# Patient Record
Sex: MALE | Race: WHITE | NOT HISPANIC OR LATINO | Employment: FULL TIME | URBAN - METROPOLITAN AREA
[De-identification: names, ages, dates, MRNs, and addresses within clinical notes are randomized per-mention and may not be internally consistent; named-entity substitution may affect disease eponyms.]

---

## 2017-02-03 ENCOUNTER — ALLSCRIPTS OFFICE VISIT (OUTPATIENT)
Dept: OTHER | Facility: OTHER | Age: 52
End: 2017-02-03

## 2017-02-21 RX ORDER — BETAMETHASONE DIPROPIONATE 0.5 MG/G
CREAM TOPICAL 2 TIMES DAILY
COMMUNITY
End: 2022-05-05

## 2017-02-23 ENCOUNTER — ANESTHESIA EVENT (OUTPATIENT)
Dept: GASTROENTEROLOGY | Facility: MEDICAL CENTER | Age: 52
End: 2017-02-23
Payer: COMMERCIAL

## 2017-02-23 ENCOUNTER — GENERIC CONVERSION - ENCOUNTER (OUTPATIENT)
Dept: GASTROENTEROLOGY | Facility: MEDICAL CENTER | Age: 52
End: 2017-02-23

## 2017-02-23 ENCOUNTER — HOSPITAL ENCOUNTER (OUTPATIENT)
Facility: MEDICAL CENTER | Age: 52
Setting detail: OUTPATIENT SURGERY
Discharge: HOME | End: 2017-02-23
Attending: INTERNAL MEDICINE | Admitting: INTERNAL MEDICINE
Payer: COMMERCIAL

## 2017-02-23 ENCOUNTER — ANESTHESIA (OUTPATIENT)
Dept: GASTROENTEROLOGY | Facility: MEDICAL CENTER | Age: 52
End: 2017-02-23
Payer: COMMERCIAL

## 2017-02-23 VITALS
HEART RATE: 68 BPM | HEIGHT: 70 IN | BODY MASS INDEX: 23.19 KG/M2 | DIASTOLIC BLOOD PRESSURE: 94 MMHG | SYSTOLIC BLOOD PRESSURE: 126 MMHG | RESPIRATION RATE: 16 BRPM | TEMPERATURE: 96.7 F | WEIGHT: 162 LBS | OXYGEN SATURATION: 100 %

## 2017-02-23 DIAGNOSIS — Z12.11 ENCOUNTER FOR SCREENING FOR MALIGNANT NEOPLASM OF COLON: ICD-10-CM

## 2017-02-23 PROCEDURE — 88305 TISSUE EXAM BY PATHOLOGIST: CPT | Performed by: INTERNAL MEDICINE

## 2017-02-23 RX ORDER — PROPOFOL 10 MG/ML
INJECTION, EMULSION INTRAVENOUS AS NEEDED
Status: DISCONTINUED | OUTPATIENT
Start: 2017-02-23 | End: 2017-02-23 | Stop reason: SURG

## 2017-02-23 RX ORDER — SODIUM CHLORIDE 9 MG/ML
125 INJECTION, SOLUTION INTRAVENOUS CONTINUOUS
Status: DISCONTINUED | OUTPATIENT
Start: 2017-02-23 | End: 2017-02-23 | Stop reason: HOSPADM

## 2017-02-23 RX ADMIN — PROPOFOL 50 MG: 10 INJECTION, EMULSION INTRAVENOUS at 12:15

## 2017-02-23 RX ADMIN — PROPOFOL 30 MG: 10 INJECTION, EMULSION INTRAVENOUS at 12:11

## 2017-02-23 RX ADMIN — PROPOFOL 50 MG: 10 INJECTION, EMULSION INTRAVENOUS at 12:17

## 2017-02-23 RX ADMIN — PROPOFOL 30 MG: 10 INJECTION, EMULSION INTRAVENOUS at 12:10

## 2017-02-23 RX ADMIN — LIDOCAINE HYDROCHLORIDE 50 MG: 20 INJECTION, SOLUTION INTRAVENOUS at 12:04

## 2017-02-23 RX ADMIN — SODIUM CHLORIDE: 0.9 INJECTION, SOLUTION INTRAVENOUS at 11:57

## 2017-02-23 RX ADMIN — PROPOFOL 30 MG: 10 INJECTION, EMULSION INTRAVENOUS at 12:22

## 2017-02-23 RX ADMIN — SODIUM CHLORIDE 125 ML/HR: 0.9 INJECTION, SOLUTION INTRAVENOUS at 11:30

## 2017-02-23 RX ADMIN — PROPOFOL 40 MG: 10 INJECTION, EMULSION INTRAVENOUS at 12:31

## 2017-02-23 RX ADMIN — PROPOFOL 30 MG: 10 INJECTION, EMULSION INTRAVENOUS at 12:28

## 2017-02-23 RX ADMIN — PROPOFOL 40 MG: 10 INJECTION, EMULSION INTRAVENOUS at 12:12

## 2017-02-23 RX ADMIN — PROPOFOL 100 MG: 10 INJECTION, EMULSION INTRAVENOUS at 12:09

## 2017-03-12 ENCOUNTER — GENERIC CONVERSION - ENCOUNTER (OUTPATIENT)
Dept: OTHER | Facility: OTHER | Age: 52
End: 2017-03-12

## 2017-03-30 ENCOUNTER — GENERIC CONVERSION - ENCOUNTER (OUTPATIENT)
Dept: OTHER | Facility: OTHER | Age: 52
End: 2017-03-30

## 2018-01-10 NOTE — RESULT NOTES
Message   Pathology showed hyperplastic polyp  This is benign polyp  Colonoscopy recommended in ten years  Verified Results  (1) TISSUE EXAM [id number] 12:20PM Ok Mueller     Test Name Result Flag Reference   LAB AP CASE REPORT (Report)     Surgical Pathology Report             Case: [id number]                   Authorizing Provider: Declan Phan MD      Collected:      02/23/2017 1220        Ordering Location:   [facility]    Received:      02/24/2017 [address] End[facility]                            Pathologist:      Nelia Baxter MD                                 Specimens:  A) - Polyp, Colorectal, Transverse colon polyp                             B) - Polyp, Colorectal, Rectal polyp times 2   LAB AP FINAL DIAGNOSIS (Report)     A  Transverse colon polyp (biopsy):    - Polypoid colonic mucosa with no significant pathologic abnormality  Lymphoid aggregates noted  - No high-grade dysplasia or malignancy identified  B  Rectal polyp ??2 (biopsies):    - Portions of polypoid colonic mucosa with features of hyperplastic   polyps  - No high-grade dysplasia or malignancy identified  Electronically signed by Nelia Baxter MD on 2/27/2017 at 11:42 AM   LAB AP NOTE      Interpretation performed at [facility], [address] [place] [address], [address] [place] [address] Drive [address]  LAB AP SURGICAL ADDITIONAL INFORMATION (Report)     These tests were developed and their performance characteristics   determined by [facility] or [address] [place] [address]  They may not be cleared or approved by the [place]  Food and   Drug Administration  The FDA has determined that such clearance or   approval is not necessary  These tests are used for clinical purposes  They should not be regarded as investigational or for research  This   laboratory has been approved by [place] [id number], designated as a high-complexity   laboratory and is qualified to perform these tests     LAB AP GROSS DESCRIPTION (Report)     A  The specimen is received in formalin, labeled with the patient's name   and hospital number, and is designated transverse colon polyp  The   specimen consists of a single white to tan soft tissue fragment measuring   0 4 x 0 3 x 0 2 cm  Entirely submitted  One cassette  Note: The estimated total formalin fixation time based upon information   provided by the submitting clinician and the standard processing schedule   is 25 75 hours  B  The specimen is received in formalin, labeled with the patient's name   and hospital number, and is designated rectal polyp times 2  The   specimen consists of 2 white to tan to brown soft tissue fragments   measuring 0 3 and 0 7 centimeters in greatest dimension  Entirely   submitted  One cassette  Note: The estimated total formalin fixation time based upon information   provided by the submitting clinician and the standard processing schedule   is 25 5 hours        ACL   LAB AP CLINICAL INFORMATION      Encounter for screening for malignant neoplasm of colon

## 2018-01-14 VITALS
BODY MASS INDEX: 24.34 KG/M2 | OXYGEN SATURATION: 98 % | HEART RATE: 80 BPM | SYSTOLIC BLOOD PRESSURE: 118 MMHG | TEMPERATURE: 96.5 F | HEIGHT: 70 IN | WEIGHT: 170 LBS | RESPIRATION RATE: 18 BRPM | DIASTOLIC BLOOD PRESSURE: 70 MMHG

## 2018-08-20 ENCOUNTER — EVALUATION (OUTPATIENT)
Dept: PHYSICAL THERAPY | Facility: REHABILITATION | Age: 53
End: 2018-08-20
Payer: COMMERCIAL

## 2018-08-20 DIAGNOSIS — Z98.890 S/P ARTHROSCOPIC SURGERY OF LEFT KNEE: ICD-10-CM

## 2018-08-20 DIAGNOSIS — M94.262 CHONDROMALACIA, LEFT KNEE: ICD-10-CM

## 2018-08-20 DIAGNOSIS — M25.562 LEFT KNEE PAIN, UNSPECIFIED CHRONICITY: Primary | ICD-10-CM

## 2018-08-20 PROCEDURE — G8979 MOBILITY GOAL STATUS: HCPCS | Performed by: PHYSICAL THERAPIST

## 2018-08-20 PROCEDURE — G8978 MOBILITY CURRENT STATUS: HCPCS | Performed by: PHYSICAL THERAPIST

## 2018-08-20 PROCEDURE — 97161 PT EVAL LOW COMPLEX 20 MIN: CPT | Performed by: PHYSICAL THERAPIST

## 2018-08-20 PROCEDURE — 97110 THERAPEUTIC EXERCISES: CPT | Performed by: PHYSICAL THERAPIST

## 2018-08-20 NOTE — PROGRESS NOTES
PT Evaluation     Today's date: 2018  Patient name: Jessee Laura  : 1965  MRN: [id number]  Referring provider: Vlad Rosa MD  Dx:   Encounter Diagnosis     ICD-10-CM    1  Left knee pain, unspecified chronicity M25 562    2  Chondromalacia, left knee M94 262    3  S/P arthroscopic surgery of left knee Z98 890        Start Time: 52  Stop Time: 0558  Total time in clinic (min): 50 minutes    Assessment  Impairments: abnormal gait, abnormal or restricted ROM, impaired physical strength and lacks appropriate home exercise program    Assessment details: Patient is a 45year old male that presents status post left arthroscopic knee surgery  Patient presents with decreased strength, decreased ROM, and gait abnormalitities  Patient has difficulty with recreational activities, ambulation, negotiation of stairs, and housework secondary to impairments  Patient would benefit from skilled physical therapy services to address impairments to maximize function  Barriers to therapy: none  Understanding of Dx/Px/POC: good   Prognosis: good    Goals  Impairment:  1  Patient will reports 50% reduction in pain in 4 weeks to maximize function  2   Patient will improve strength to 4+/5 in all planes to maximize function  3   Patient will improve ROM to [facility] in 4 weeks to maximize function  Functional:  1  Patient will improve FOTO by 33 points to 67/100 in 4 weeks to maximize function  2  Patient will be independent with HEP in 4 weeks to maximize function  3  Patient will report no difficulty with ambulation in 4 weeks to maximize function  4  Patient will report no difficulty with negotiation of stairs in 4 weeks to maximize function  5  Patient will report no difficulty with house/yard work in 4 weeks to maximize function        Plan  Patient would benefit from: skilled physical therapy  Planned modality interventions: cryotherapy  Planned therapy interventions: home exercise program, functional ROM exercises, therapeutic exercise, strengthening, stretching, patient education and neuromuscular re-education  Frequency: 2x week  Duration in visits: 9  Duration in weeks: 6  Plan of Care beginning date: 2018  Treatment plan discussed with: patient  Plan details: Will start at two visits week transitioning to one visit/week when appropriate  Will RE in 6 weeks  Subjective Evaluation    History of Present Illness  Date of surgery: 2018  Mechanism of injury: Patient presents status post left arthroscopic knee surgery on 2018  Patient reports a history of left knee pain for about 12 years, but started to have increased pain in May 2018  Patient reports difficulty with soccer, skiing, volleyball, ambulation, negotiation of stairs, ADLS such as LE dressing, and house/yard work  Pain  Current pain ratin  At best pain ratin  At worst pain ratin  Location: posterior/medial knee  Quality: dull ache  Relieving factors: ice  Aggravating factors: walking and stair climbing    Social Support  Stairs in house: yes (basement)     Patient Goals  Patient goals for therapy: return to sport/leisure activities, decreased pain, increased motion and increased strength  Patient goal: ambulation        Objective     Observations   Left Knee   Positive for effusion and incision  Additional Observation Details  Steri-strips in place, appears healthy, reviewed signs of infection    Passive Range of Motion   Left Knee   Flexion: 110 degrees with pain  Prone flexion: 90 degrees   Extension: -20 degrees with pain    Right Knee   Normal passive range of motion    Mobility   Patellar Mobility:   Left Knee   WFL: medial, lateral, superior and inferior       Strength/Myotome Testing     Left Hip   Planes of Motion   Flexion: 5  Abduction: 4    Right Hip   Planes of Motion   Flexion: 5  Abduction: 4+    Left Knee   Flexion: 5  Extension: 4-    Right Knee   Normal strength    Left Ankle/Foot   Dorsiflexion: 5    Right Ankle/Foot   Dorsiflexion: 5    Ambulation     Ambulation: Level Surfaces   Ambulation without assistive device: independent    Observational Gait   Decreased walking speed and stride length     Left foot contact pattern: foot flat      Flowsheet Rows      Most Recent Value   PT/OT G-Codes   Current Score  34   Projected Score  67   FOTO information reviewed  Yes   Assessment Type  Evaluation   G code set  Mobility: Walking & Moving Around   Mobility: Walking and Moving Around Current Status ()  CL   Mobility: Walking and Moving Around Goal Status ()  CJ          Precautions: WBAT    Daily Treatment Diary     Manual              knee PROM                                                                     Exercise Diary  8/20            Recumbent bike             LLLD knee ext stretch provided HEP            Quad set provided HEP            sidelying hip abduction provided HEP            Prone hip ext             AAROM heel slide 20x            sidestepping             Pilates reformer squat             SAQ                                                                                                                                                                Modalities              ice

## 2018-08-20 NOTE — LETTER
2018    Renae You MD  [facility]    Patient: Armaan Whitten   YOB: 1965   Date of Visit: 2018     Encounter Diagnosis     ICD-10-CM    1  Left knee pain, unspecified chronicity M25 562    2  Chondromalacia, left knee M94 262    3  S/P arthroscopic surgery of left knee Z98 890        Dear Dr Day Del Cid:    Please review the attached Plan of Care from [facility] recent visit  Please verify that you agree therapy should continue by signing the attached document and sending it back to our office  If you have any questions or concerns, please don't hesitate to call  Sincerely,    Nicolás Underwood, PT      Referring Provider:      I certify that I have read the below Plan of Care and certify the need for these services furnished under this plan of treatment while under my care  Renae You MD  [address]: [phone]          PT Evaluation     Today's date: 2018  Patient name: Armaan Whitten  : 1965  MRN: [id number]  Referring provider: Kal Bucio MD  Dx:   Encounter Diagnosis     ICD-10-CM    1  Left knee pain, unspecified chronicity M25 562    2  Chondromalacia, left knee M94 262    3  S/P arthroscopic surgery of left knee Z98 890        Start Time: 75  Stop Time: 7406  Total time in clinic (min): 50 minutes    Assessment  Impairments: abnormal gait, abnormal or restricted ROM, impaired physical strength and lacks appropriate home exercise program    Assessment details: Patient is a 45year old male that presents status post left arthroscopic knee surgery  Patient presents with decreased strength, decreased ROM, and gait abnormalitities  Patient has difficulty with recreational activities, ambulation, negotiation of stairs, and housework secondary to impairments    Patient would benefit from skilled physical therapy services to address impairments to maximize function  Barriers to therapy: none  Understanding of Dx/Px/POC: good   Prognosis: good    Goals  Impairment:  1  Patient will reports 50% reduction in pain in 4 weeks to maximize function  2   Patient will improve strength to 4+/5 in all planes to maximize function  3   Patient will improve ROM to [facility] in 4 weeks to maximize function  Functional:  1  Patient will improve FOTO by 33 points to 67/100 in 4 weeks to maximize function  2  Patient will be independent with HEP in 4 weeks to maximize function  3  Patient will report no difficulty with ambulation in 4 weeks to maximize function  4  Patient will report no difficulty with negotiation of stairs in 4 weeks to maximize function  5  Patient will report no difficulty with house/yard work in 4 weeks to maximize function  Plan  Patient would benefit from: skilled physical therapy  Planned modality interventions: cryotherapy  Planned therapy interventions: home exercise program, functional ROM exercises, therapeutic exercise, strengthening, stretching, patient education and neuromuscular re-education  Frequency: 2x week  Duration in visits: 9  Duration in weeks: 6  Plan of Care beginning date: 2018  Treatment plan discussed with: patient  Plan details: Will start at two visits week transitioning to one visit/week when appropriate  Will RE in 6 weeks  Subjective Evaluation    History of Present Illness  Date of surgery: 2018  Mechanism of injury: Patient presents status post left arthroscopic knee surgery on 2018  Patient reports a history of left knee pain for about 12 years, but started to have increased pain in May 2018  Patient reports difficulty with soccer, skiing, volleyball, ambulation, negotiation of stairs, ADLS such as LE dressing, and house/yard work       Pain  Current pain ratin  At best pain ratin  At worst pain ratin  Location: posterior/medial knee  Quality: dull ache  Relieving factors: ice  Aggravating factors: walking and stair climbing    Social Support  Stairs in house: yes (basement)     Patient Goals  Patient goals for therapy: return to sport/leisure activities, decreased pain, increased motion and increased strength  Patient goal: ambulation        Objective     Observations   Left Knee   Positive for effusion and incision  Additional Observation Details  Steri-strips in place, appears healthy, reviewed signs of infection    Passive Range of Motion   Left Knee   Flexion: 110 degrees with pain  Prone flexion: 90 degrees   Extension: -20 degrees with pain    Right Knee   Normal passive range of motion    Mobility   Patellar Mobility:   Left Knee   WFL: medial, lateral, superior and inferior  Strength/Myotome Testing     Left Hip   Planes of Motion   Flexion: 5  Abduction: 4    Right Hip   Planes of Motion   Flexion: 5  Abduction: 4+    Left Knee   Flexion: 5  Extension: 4-    Right Knee   Normal strength    Left Ankle/Foot   Dorsiflexion: 5    Right Ankle/Foot   Dorsiflexion: 5    Ambulation     Ambulation: Level Surfaces   Ambulation without assistive device: independent    Observational Gait   Decreased walking speed and stride length     Left foot contact pattern: foot flat      Flowsheet Rows      Most Recent Value   PT/OT G-Codes   Current Score  34   Projected Score  67   FOTO information reviewed  Yes   Assessment Type  Evaluation   G code set  Mobility: Walking & Moving Around   Mobility: Walking and Moving Around Current Status ()  CL   Mobility: Walking and Moving Around Goal Status ()  CJ          Precautions: WBAT    Daily Treatment Diary     Manual              knee PROM                                                                     Exercise Diary  8/20            Recumbent bike             LLLD knee ext stretch provided HEP            Quad set provided HEP            sidelying hip abduction provided HEP            Prone hip ext AAROM heel slide 20x            sidestepping             Pilates reformer squat             SAQ                                                                                                                                                                Modalities              ice

## 2018-08-21 ENCOUNTER — TRANSCRIBE ORDERS (OUTPATIENT)
Dept: PHYSICAL THERAPY | Facility: REHABILITATION | Age: 53
End: 2018-08-21

## 2018-08-21 DIAGNOSIS — M94.262 CHONDROMALACIA, LEFT KNEE: ICD-10-CM

## 2018-08-21 DIAGNOSIS — Z98.890 S/P ARTHROSCOPIC SURGERY OF LEFT KNEE: ICD-10-CM

## 2018-08-21 DIAGNOSIS — M25.562 LEFT KNEE PAIN, UNSPECIFIED CHRONICITY: Primary | ICD-10-CM

## 2018-08-23 ENCOUNTER — OFFICE VISIT (OUTPATIENT)
Dept: PHYSICAL THERAPY | Facility: REHABILITATION | Age: 53
End: 2018-08-23
Payer: COMMERCIAL

## 2018-08-23 DIAGNOSIS — M94.262 CHONDROMALACIA, LEFT KNEE: ICD-10-CM

## 2018-08-23 DIAGNOSIS — M25.562 LEFT KNEE PAIN, UNSPECIFIED CHRONICITY: Primary | ICD-10-CM

## 2018-08-23 DIAGNOSIS — Z98.890 S/P ARTHROSCOPIC SURGERY OF LEFT KNEE: ICD-10-CM

## 2018-08-23 PROCEDURE — 97110 THERAPEUTIC EXERCISES: CPT | Performed by: PHYSICAL THERAPIST

## 2018-08-23 NOTE — PROGRESS NOTES
Daily Note     Today's date: 2018  Patient name: Riley Keen  : 1965  MRN: [id number]  Referring provider: Shukri Interiano MD  Dx:   Encounter Diagnosis     ICD-10-CM    1  Left knee pain, unspecified chronicity M25 562    2  Chondromalacia, left knee M94 262    3  S/P arthroscopic surgery of left knee Z98 890                   Subjective: Patient reported that he parked his car further away and felt "fatigue" while making it to the steps today at work  Patient mentioned that he has decreased pain and has been feeling stronger while at work  Patient noted that he has discontinued using pillows under his knee  Patient also mentioned that there was some swelling after walking long distances  Patient mentioned that he is excited to get back to working out         Objective: See treatment diary below  Precautions: WBAT     Daily Treatment Diary      Manual                        knee PROM    np                                                                                                                         Exercise Diary                     Recumbent bike  np  7 min                   LLLD knee ext stretch provided HEP  np                   Quad set provided HEP  x20                   sidelying hip abduction provided HEP  x10                   Prone hip ext    x15                   AAROM heel slide 20x  5''x20  dc                 sidestepping    3x ea  band                 Pilates reformer squat   x15, x10 single leg RYR L2  single leg only RYRG                 SAQ    5'' x20  weight                 SLR     x5                    bridge    NV                    SLS    NV                                                                                                                                                                                                                         Modalities                        ice  declined                                                                           Assessment: Patient tolerated treatment well  Patient has progressed in hip and knee strengthening  Patient was cued to prevent quad lag, but able to perform SLR  Patient was able to to complete revolutions on bike without difficulty and has increased knee flexion since last visit to 145 degrees  Patient would benefit from continued PT to work on strengthening and ROM to improve function  Plan: Progress treatment as tolerated      Treatment performed by Jonny Buchanan, SPT supervised by Aldo Irvin, JOYT   One on one with Aldo Anne, KIM from 6:00 pm-6:20 pm

## 2018-08-27 ENCOUNTER — OFFICE VISIT (OUTPATIENT)
Dept: PHYSICAL THERAPY | Facility: REHABILITATION | Age: 53
End: 2018-08-27
Payer: COMMERCIAL

## 2018-08-27 DIAGNOSIS — M94.262 CHONDROMALACIA, LEFT KNEE: ICD-10-CM

## 2018-08-27 DIAGNOSIS — M25.562 LEFT KNEE PAIN, UNSPECIFIED CHRONICITY: Primary | ICD-10-CM

## 2018-08-27 DIAGNOSIS — Z98.890 S/P ARTHROSCOPIC SURGERY OF LEFT KNEE: ICD-10-CM

## 2018-08-27 PROCEDURE — 97110 THERAPEUTIC EXERCISES: CPT | Performed by: PHYSICAL THERAPIST

## 2018-08-27 NOTE — PROGRESS NOTES
Daily Note     Today's date: 2018  Patient name: Myrtle Webb  : 1965  MRN: [id number]  Referring provider: Jace Snider MD  Dx:   Encounter Diagnosis     ICD-10-CM    1  Left knee pain, unspecified chronicity M25 562    2  Chondromalacia, left knee M94 262    3  S/P arthroscopic surgery of left knee Z98 890                   Subjective: Patient reported that he "overdid" it on  and had bumped up his "active time" to 49 minutes  He noted that he felt some "pulling" at the back of his knee and ice helped it  Rodolfo Almanzar said, "I almost ran down steps today", forgetting that I injured my leg  Pt noted HEP compliance         Objective: See treatment diary below  Precautions: WBAT     Daily Treatment Diary      Manual                      knee PROM    np  dc                                                                                                                       Exercise Diary                   Recumbent bike  np  7 min  7 min                 LLLD knee ext stretch provided HEP  np  np                 Quad set provided HEP  x20  x20                 sidelying hip abduction provided HEP  x10  x15                 Prone hip ext    x15  x10                 AAROM heel slide 20x  5''x20  dc                 sidestepping    3x ea  x2 ea,Red                 Pilates reformer squat    x15, x10 single leg RYR L2 Single leg x6,   double leg x10 RYRG                 SAQ    5'' x20 2lb x15                 SLR     x5  x10                  bridge    NV  5''x10                  SLS    NV  30 secx3                 Mini-squat      nv                 Lunge on Bosu       ?                  quad stretch      ?                                                                                                                                               Modalities                    ice  declined  declined                                                                    Assessment: Patient tolerated treatment well  Patient needed cuing for quad set , but was able to perform  Patient was cued during SLR, but had visually improved since last visit  Patient was measured at 0 degrees of L knee extension  Patient would benefit from continued PT to improve on strengthening, ROM, and proprioception to maximize function  Plan: Progress treatment as tolerated        Patient was treated by MALCOLM Tamez and supervised by King Tolentino DPT

## 2018-08-30 ENCOUNTER — OFFICE VISIT (OUTPATIENT)
Dept: PHYSICAL THERAPY | Facility: REHABILITATION | Age: 53
End: 2018-08-30
Payer: COMMERCIAL

## 2018-08-30 DIAGNOSIS — M94.262 CHONDROMALACIA, LEFT KNEE: ICD-10-CM

## 2018-08-30 DIAGNOSIS — M25.562 LEFT KNEE PAIN, UNSPECIFIED CHRONICITY: Primary | ICD-10-CM

## 2018-08-30 DIAGNOSIS — Z98.890 S/P ARTHROSCOPIC SURGERY OF LEFT KNEE: ICD-10-CM

## 2018-08-30 PROCEDURE — 97110 THERAPEUTIC EXERCISES: CPT

## 2018-08-30 PROCEDURE — 97112 NEUROMUSCULAR REEDUCATION: CPT

## 2018-08-30 NOTE — PROGRESS NOTES
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    3  S/P arthroscopic surgery of left knee Z98 890                   Subjective: Pt reports being on his feet for good portion of the morning then sat for "too long" causing increase in tightness arriving to PT today  Objective: See treatment diary below  Precautions: WBAT     Daily Treatment Diary      Manual                    knee PROM    np  dc                                                                                                                       Exercise Diary                 Recumbent bike  np  7 min  7 min  7 min               LLLD knee ext stretch provided HEP  np  np  np               Quad set provided HEP  x20  x20  5"x20               sidelying hip abduction provided HEP  x10  x15  x15               Prone hip ext    x15  x10 3"x15               AAROM heel slide 20x  5''x20  dc                 sidestepping    3x ea  x2 ea,Red  rtb x3 ea               Pilates reformer squat    x15, x10 single leg RYR L2 Single leg x6,   double leg x10 RYRG RYRG single x10 double x15               SAQ    5'' x20 2lb x15  2# x15               SLR     x5  x10  x10                bridge    NV  5''x10  5"x15                SLS    NV  30 secx3  30"x3               Mini-squat      nv  x10               Lunge on Bosu       ?  np                quad stretch      ?                                                                                                                                               Modalities                ice  declined  declined    np                                                                Assessment: Patient tolerated treatment well   Pt has hip compensation present with quad sets in supine, in seated position patient is able to achieve good quad set and control  Pt fatigues with TE, needs VC for TKE with SLR as patient has mild quad lag with fatigue that he is able to correct with cues  Initiated mini squats, needs cues to remain within painfree ROM with equal weight bearing bilaterally  Patient would benefit from continued PT  Plan: Progress treatment as tolerated

## 2018-09-06 ENCOUNTER — OFFICE VISIT (OUTPATIENT)
Dept: PHYSICAL THERAPY | Facility: REHABILITATION | Age: 53
End: 2018-09-06
Payer: COMMERCIAL

## 2018-09-06 DIAGNOSIS — Z98.890 S/P ARTHROSCOPIC SURGERY OF LEFT KNEE: ICD-10-CM

## 2018-09-06 DIAGNOSIS — M94.262 CHONDROMALACIA, LEFT KNEE: ICD-10-CM

## 2018-09-06 DIAGNOSIS — M25.562 LEFT KNEE PAIN, UNSPECIFIED CHRONICITY: Primary | ICD-10-CM

## 2018-09-06 PROCEDURE — 97112 NEUROMUSCULAR REEDUCATION: CPT

## 2018-09-06 PROCEDURE — 97110 THERAPEUTIC EXERCISES: CPT

## 2018-09-06 NOTE — PROGRESS NOTES
Daily Note     Today's date: 2018  Patient name: Danish Toure  : 1965  MRN: [id number]  Referring provider: Ahsan Copeland MD  Dx:   Encounter Diagnosis     ICD-10-CM    1  Left knee pain, unspecified chronicity M25 562    2  Chondromalacia, left knee M94 262    3  S/P arthroscopic surgery of left knee Z98 890                   Subjective: Pt reports he may have over did it over the weekend  Pt notes performing on average 4 miles of walking daily according to his exercise tracker, but because he felt good he performed more walking activities causing increase in soreness along medial joint line  Pt notes icing and resting for sx control with good relief, notes sx's are minimal arriving to PT today        Objective: See treatment diary below  Precautions: WBAT     Daily Treatment Diary      Manual                  knee PROM    np  dc                                                                                                                       Exercise Diary               Recumbent bike  np  7 min  7 min  7 min 5 min             LLLD knee ext stretch provided HEP  np  np  np  np             Quad set provided HEP  x20  x20  5"x20  5"x20             sidelying hip abduction provided HEP  x10  x15  x15  x18             Prone hip ext    x15  x10 3"x15  3"x15             AAROM heel slide 20x  5''x20  dc                 sidestepping    3x ea  x2 ea,Red  rtb x3 ea  rtb x4 ea             Pilates reformer squat    x15, x10 single leg RYR L2 Single leg x6,   double leg x10 RYRG RYRG single x10 double x15 RYRG single x15 double x20             SAQ    5'' x20 2lb x15  2# x15  2# x20             SLR     x5  x10  x10  3"x10              bridge    NV  5''x10  5"x15  5"x15              SLS    NV  30 secx3  30"x3  30"x3             Mini-squat      nv  x10  x15             Lunge on Bosu       ?  np  x10              quad stretch      ?                                                                                                                                               Modalities   8/23 8/27 8/30 9/6           ice  declined  declined    np  np                                                              Assessment: Patient tolerated treatment well  Pt demo's improved strength with SLR, needs cues to initiate quad contraction but is better able to maintain throughout and tolerated progressions as noted  Initiated lunges with anterior foot on bosu, needs cueing to maintain equal weight bearing bilaterally and performs without pain  Patient would benefit from continued PT  Plan: Progress treatment as tolerated

## 2018-09-10 ENCOUNTER — OFFICE VISIT (OUTPATIENT)
Dept: PHYSICAL THERAPY | Facility: REHABILITATION | Age: 53
End: 2018-09-10
Payer: COMMERCIAL

## 2018-09-10 DIAGNOSIS — M94.262 CHONDROMALACIA, LEFT KNEE: ICD-10-CM

## 2018-09-10 DIAGNOSIS — M25.562 LEFT KNEE PAIN, UNSPECIFIED CHRONICITY: Primary | ICD-10-CM

## 2018-09-10 DIAGNOSIS — Z98.890 S/P ARTHROSCOPIC SURGERY OF LEFT KNEE: ICD-10-CM

## 2018-09-10 PROCEDURE — G8978 MOBILITY CURRENT STATUS: HCPCS | Performed by: PHYSICAL THERAPIST

## 2018-09-10 PROCEDURE — 97110 THERAPEUTIC EXERCISES: CPT | Performed by: PHYSICAL THERAPIST

## 2018-09-10 PROCEDURE — G8979 MOBILITY GOAL STATUS: HCPCS | Performed by: PHYSICAL THERAPIST

## 2018-09-10 PROCEDURE — 97112 NEUROMUSCULAR REEDUCATION: CPT | Performed by: PHYSICAL THERAPIST

## 2018-09-10 NOTE — PROGRESS NOTES
Daily Note     Today's date: 9/10/2018  Patient name: Ulises Epps  : 1965  MRN: [id number]  Referring provider: Richa Bernal MD  Dx:   Encounter Diagnosis     ICD-10-CM    1  Left knee pain, unspecified chronicity M25 562    2  Chondromalacia, left knee M94 262    3  S/P arthroscopic surgery of left knee [id number] [id number]                   Subjective: Patient reports that he is doing well this visit  He had some soreness after standing at a concert on Saturday for about 4 hours, but did not have significant soreness on   Patient reports riding a stationary bike this morning with some mild anterior knee pain          Objective: See treatment diary below  Precautions: WBAT     Daily Treatment Diary      Manual                  knee PROM    np  dc                                                                                                                       Exercise Diary  8/20  8/23  8/27  8/30  9/6  9/10         Recumbent bike  np  7 min  7 min  7 min 5 min  9 min         LLLD knee ext stretch provided HEP  np  np  np  np np         Quad set provided HEP  x20  x20  5"x20  5"x20 D/c         sidelying hip abduction provided HEP  x10  x15  x15  x18 5" 20x         Prone hip ext    x15  x10 3"x15  3"x15  5" 20x         AAROM heel slide 20x  5''x20  dc               sidestepping    3x ea  x2 ea,Red  rtb x3 ea  rtb x4 ea  green 3 laps         Pilates reformer squat    x15, x10 single leg RYR L2 Single leg x6,   double leg x10 RYRG RYRG single x10 double x15 RYRG single x15 double x20  RYRGB L2 20x yue,          SAQ    5'' x20 2lb x15  2# x15  2# x20  np         SLR     x5  x10  x10  3"x10  3" 20x          bridge    NV  5''x10  5"x15  5"x15  5" 20x          SLS    NV  30 secx3  30"x3  30"x3  3x 30" foam  bosu NV       Mini-squat bosu      nv  x10  x15  15x bosu         Lunge on Bosu       ?  np  x10  20x          quad stretch      ?      3x 45" R          monster walks for/rev            3 laps green                                                                                                       Modalities   8/23  8/27    8/30  9/6  9/10         ice  declined  declined    np  np  np                                                                  Assessment: Tolerated treatment well  Patient would benefit from continued PT  Patient continues to progress well with strength and ROM  Patient continues to have significant limitation with prone knee flexion limited to about 90 degrees on L this visit (120 deg R)  Continue to progress knee and hip strengthening  Plan: Progress treatment as tolerated        One on one with Malick Pete PTA from 5:05 pm- 5:20 pm, un-supervised therex from 5:00 pm-5:05 pm

## 2018-09-18 ENCOUNTER — OFFICE VISIT (OUTPATIENT)
Dept: PHYSICAL THERAPY | Facility: REHABILITATION | Age: 53
End: 2018-09-18
Payer: COMMERCIAL

## 2018-09-18 DIAGNOSIS — Z98.890 S/P ARTHROSCOPIC SURGERY OF LEFT KNEE: ICD-10-CM

## 2018-09-18 DIAGNOSIS — M94.262 CHONDROMALACIA, LEFT KNEE: ICD-10-CM

## 2018-09-18 DIAGNOSIS — M25.562 LEFT KNEE PAIN, UNSPECIFIED CHRONICITY: Primary | ICD-10-CM

## 2018-09-18 PROCEDURE — 97112 NEUROMUSCULAR REEDUCATION: CPT

## 2018-09-18 PROCEDURE — 97110 THERAPEUTIC EXERCISES: CPT

## 2018-09-18 NOTE — PROGRESS NOTES
Daily Note     Today's date: 2018  Patient name: Arielle Mcduffie  : 1965  MRN: [id number]  Referring provider: Noris Roca MD  Dx:   Encounter Diagnosis     ICD-10-CM    1  Left knee pain, unspecified chronicity M25 562    2  Chondromalacia, left knee M94 262    3  S/P arthroscopic surgery of left knee [id number] [id number]                   Subjective: Patient reports having f/u with MD since last visit, notes physician is happy with progress and would like patient to continue with therapy to improve strength and ROM  Pt also notes ambulating at a [employer] campus over the weekend with no increase in pain or muscle soreness  Pt reports he has been attending the gym to work on strength as well      Objective: See treatment diary below  Precautions: WBAT     Daily Treatment Diary      Manual     8/23  8/27  8/30  9/6  9/10  9/18         knee PROM    np  dc                                                                                                                       Exercise Diary  8/20  8/23  8/27  8/30  9/6  9/10  9/18       Recumbent bike  np  7 min  7 min  7 min 5 min  9 min 10 min       LLLD knee ext stretch provided HEP  np  np  np  np np  np       Quad set provided HEP  x20  x20  5"x20  5"x20 D/c  dc       sidelying hip abduction provided HEP  x10  x15  x15  x18 5" 20x  5"x20       Prone hip ext    x15  x10 3"x15  3"x15  5" 20x         AAROM heel slide 20x  5''x20  dc               sidestepping    3x ea  x2 ea,Red  rtb x3 ea  rtb x4 ea  green 3 laps  gtb x3 ea       Pilates reformer squat    x15, x10 single leg RYR L2 Single leg x6,   double leg x10 RYRG RYRG single x10 double x15 RYRG single x15 double x20 RYRGB L2 20x yue,  RYRGB L2 single x15, yue x20       SAQ    5'' x20 2lb x15  2# x15  2# x20  np  2# x10       SLR     x5  x10  x10  3"x10  3" 20x  3"x20        bridge    NV  5''x10  5"x15  5"x15  5" 20x  5"x20        SLS    NV  30 secx3  30"x3  30"x3  3x 30" foam  bosu 20"x3       Mini-squat bosu      nv  x10  x15  15x bosu bosu x15       Lunge on Bosu       ?  np  x10  20x  x20        quad stretch      ?      3x 45" R  45"x3        monster walks for/rev            3 laps green  gtb x3 ea                                                                                                     Modalities   8/23  8/27    8/30  9/6  9/10  9/18       ice  declined  declined    np  np  np  np                                                                Assessment: Tolerated treatment well and fatigues with TE program   Pt does need frequent cueing to correct knee valgus of R LE with squatting activities, has minimal valgus with L  Pt is able to achieve good quad set and control throughout SLR, however does need reminder cues maintain TKE at times with onset of fatigue  Pt cont to be limited with prone knee flexion noting pulling along quad  Patient would benefit from continued PT  Plan: Progress treatment as tolerated

## 2018-10-22 NOTE — PROGRESS NOTES
10/22/2018:  Patient reports that he has been doing well overall  He has been very busy and has not been able to make it into therapy  He took FOTO by email and improved by 46 points to 80/100  Patient reports no current functional deficits  Goals and follow up measurements not addressed as he self discharged prior to measurements  Impairment:  1  Patient will reports 50% reduction in pain in 4 weeks to maximize function  - met  2  Patient will improve strength to 4+/5 in all planes to maximize function -not assessed  3  Patient will improve ROM to [facility] in 4 weeks to maximize function  -met    Functional:  1  Patient will improve FOTO by 33 points to 67/100 in 4 weeks to maximize function  -met  2  Patient will be independent with HEP in 4 weeks to maximize function  -met  3  Patient will report no difficulty with ambulation in 4 weeks to maximize function  -met  4  Patient will report no difficulty with negotiation of stairs in 4 weeks to maximize function  -met  5  Patient will report no difficulty with house/yard work in 4 weeks to maximize function  -met

## 2018-12-15 ENCOUNTER — OFFICE VISIT (OUTPATIENT)
Dept: URGENT CARE | Facility: MEDICAL CENTER | Age: 53
End: 2018-12-15
Payer: COMMERCIAL

## 2018-12-15 ENCOUNTER — APPOINTMENT (OUTPATIENT)
Dept: RADIOLOGY | Facility: MEDICAL CENTER | Age: 53
End: 2018-12-15
Payer: COMMERCIAL

## 2018-12-15 VITALS
SYSTOLIC BLOOD PRESSURE: 136 MMHG | HEART RATE: 78 BPM | TEMPERATURE: 97 F | OXYGEN SATURATION: 99 % | DIASTOLIC BLOOD PRESSURE: 80 MMHG | RESPIRATION RATE: 20 BRPM

## 2018-12-15 DIAGNOSIS — S61.112A LACERATION OF LEFT THUMB WITH DAMAGE TO NAIL, FOREIGN BODY PRESENCE UNSPECIFIED, INITIAL ENCOUNTER: ICD-10-CM

## 2018-12-15 DIAGNOSIS — S61.112A LACERATION OF LEFT THUMB WITH DAMAGE TO NAIL, FOREIGN BODY PRESENCE UNSPECIFIED, INITIAL ENCOUNTER: Primary | ICD-10-CM

## 2018-12-15 PROCEDURE — 12001 RPR S/N/AX/GEN/TRNK 2.5CM/<: CPT | Performed by: FAMILY MEDICINE

## 2018-12-15 PROCEDURE — 73140 X-RAY EXAM OF FINGER(S): CPT

## 2018-12-15 PROCEDURE — S9083 URGENT CARE CENTER GLOBAL: HCPCS | Performed by: FAMILY MEDICINE

## 2018-12-15 PROCEDURE — G0382 LEV 3 HOSP TYPE B ED VISIT: HCPCS | Performed by: FAMILY MEDICINE

## 2018-12-15 RX ORDER — AMOXICILLIN AND CLAVULANATE POTASSIUM 875; 125 MG/1; MG/1
1 TABLET, FILM COATED ORAL EVERY 12 HOURS SCHEDULED
Qty: 14 TABLET | Refills: 0 | Status: SHIPPED | OUTPATIENT
Start: 2018-12-15 | End: 2018-12-22

## 2018-12-15 NOTE — PATIENT INSTRUCTIONS
X-ray of left thumb reveals no fracture subluxation  I approximated the wound with 4 0 nylon  Ten sutures were placed  Patient tolerated procedure well  Sterile dressing applied after applying bacitracin ointment  Advised patient to change wound dressing in 24 hr   He was placed empirically on Augmentin for 10 days  He is to have sutures removed in 10 days  Care For Your Stitches   WHAT YOU NEED TO KNOW:   Stitches, or sutures, are used to close cuts and wounds on the skin  Stitches need to be removed after your wound has healed  DISCHARGE INSTRUCTIONS:   Return to the emergency department if:   · Your stitches come apart  · Blood soaks through your bandages  · You suddenly cannot move your injured joint  · You have sudden numbness around your wound  · You see red streaks coming from your wound  Contact your healthcare provider if:   · You have a fever and chills  · Your wound is red, warm, swollen, or leaking pus  · There is a bad smell coming from your wound  · You have increased pain in the wound area  · You have questions or concerns about your condition or care  Care for your stitches:  Keep your stitches clean and dry  You may need to cover your stitches with a bandage for 24 to 48 hours, or as directed  Do not bump or hit the suture area, as this could open the wound  Do not trim or shorten the ends of your stitches  If they rub on your clothing, put a gauze bandage between the stitches and your clothes  Clean your wound:  Carefully wash your wound with soap and water  For mouth and lip wounds, rinse your mouth after meals and at bedtime  Ask your healthcare provider what to use to rinse your mouth  If you have a scalp wound, you may gently wash your hair every 2 days with mild shampoo  Do not use hair products, such as hair spray  Help your wound heal:   · Elevate  your wound above the level of your heart as often as you can   This will help decrease swelling and pain  Prop your wound on pillows or blankets to keep it elevated comfortably  · Limit activity  Do not stretch the skin around your wound  This will help prevent bleeding and swelling of the wound area  Follow up with your healthcare provider as directed: You may need to return to have your stitches removed  Write down your questions so you remember to ask them during your visits  © 2017 [employer] Information is for End User's use only and may not be sold, redistributed or otherwise used for commercial purposes  All illustrations and images included in CareNotes® are the copyrighted property of [employer] , [employer]  or Santiago Christiansen  The above information is an  only  It is not intended as medical advice for individual conditions or treatments  Talk to your doctor, nurse or pharmacist before following any medical regimen to see if it is safe and effective for you

## 2018-12-15 NOTE — PROGRESS NOTES
[facility]        NAME: Janeth Camacho is a 52 y o  male  : 1965    MRN: [id number]  DATE: December 15, 2018  TIME: 10:46 AM    Assessment and Plan   Laceration of left thumb with damage to nail, foreign body presence unspecified, initial encounter [S61 112A]  1  Laceration of left thumb with damage to nail, foreign body presence unspecified, initial encounter  XR thumb left first digit-min 2v         Patient Instructions       Follow up with PCP in 3-5 days  Proceed to  ER if symptoms worsen  Chief Complaint     Chief Complaint   Patient presents with    Thumb Laceration     Pt states was cutting wood with table saw when distal thumb hit blade of table saw  Laceration noted of distal left thumb  History of Present Illness       Patient here because while working in his wood shop, he was cutting a piece of wood with a table saw and cut the tip of his left thumb at around 10:00 a m  He informs that with did splinter  He is complaining of pain in the tip of his left thumb  His last tetanus shot was approximately 5 years ago  Review of Systems   Review of Systems   Constitutional: Negative  Skin: Positive for wound  Current Medications       Current Outpatient Prescriptions:     betamethasone, augmented, (DIPROLENE-AF) 0 05 % cream, Apply topically 2 (two) times a day, Disp: , Rfl:     Current Allergies     Allergies as of 12/15/2018    (No Known Allergies)            The following portions of the patient's history were reviewed and updated as appropriate: allergies, current medications, past family history, past medical history, past social history, past surgical history and problem list      History reviewed  No pertinent past medical history      Past Surgical History:   Procedure Laterality Date    KNEE SURGERY Bilateral     VA COLONOSCOPY FLX DX W/COLLJ SPEC WHEN PFRMD N/A 2017    Procedure: COLONOSCOPY;  Surgeon: Rachell Montiel MD;  Location: [facility]; Service: Gastroenterology       No family history on file  Medications have been verified  Objective   /80 (BP Location: Right arm, Patient Position: Sitting, Cuff Size: Standard)   Pulse 78   Temp (!) 97 °F (36 1 °C) (Tympanic)   Resp 20   SpO2 99%          Physical Exam     Physical Exam   Skin:   Left upper extremity:  Full range of motion at the proximal and distal PIP joint  There is a irregular laceration of distal portion of the left thumb  It extends into the dermis  Laceration extends from the lateral aspect affecting the border of the nail  There is good tactile sensation  Good capillary refill  No active bleeding observed       Laceration repair  Date/Time: 12/15/2018 5:27 PM  Performed by: Tonya Fragoso  Authorized by: Tonya Fragoso   Consent given by: patient  Body area: upper extremity  Location details: left thumb  Laceration length: 2 cm  Foreign bodies: no foreign bodies  Tendon involvement: none  Nerve involvement: none  Vascular damage: no  Anesthesia: digital block    Anesthesia:  Local Anesthetic: lidocaine 1% without epinephrine  Anesthetic total: 7 mL      Procedure Details:  Irrigation solution: saline  Amount of cleaning: standard  Skin closure: 4-0 nylon  Number of sutures: 13  Technique: simple  Approximation: loose  Dressing: 4x4 sterile gauze and antibiotic ointment  Patient tolerance: Patient tolerated the procedure well with no immediate complications

## 2020-04-30 ENCOUNTER — AMB VIDEO VISIT (OUTPATIENT)
Dept: URGENT CARE | Facility: CLINIC | Age: 55
End: 2020-04-30

## 2020-04-30 DIAGNOSIS — R04.0 EPISTAXIS: Primary | ICD-10-CM

## 2020-04-30 RX ORDER — OXYMETAZOLINE HYDROCHLORIDE 0.05 G/100ML
2 SPRAY NASAL EVERY 12 HOURS PRN
Qty: 30 ML | Refills: 0 | Status: SHIPPED | OUTPATIENT
Start: 2020-04-30

## 2020-04-30 RX ORDER — FLUTICASONE PROPIONATE 50 MCG
1 SPRAY, SUSPENSION (ML) NASAL DAILY
Qty: 1 BOTTLE | Refills: 0 | Status: SHIPPED | OUTPATIENT
Start: 2020-04-30 | End: 2022-05-05

## 2020-05-07 ENCOUNTER — OFFICE VISIT (OUTPATIENT)
Dept: FAMILY MEDICINE CLINIC | Facility: CLINIC | Age: 55
End: 2020-05-07
Payer: COMMERCIAL

## 2020-05-07 VITALS
HEART RATE: 70 BPM | DIASTOLIC BLOOD PRESSURE: 94 MMHG | TEMPERATURE: 96.9 F | BODY MASS INDEX: 25.11 KG/M2 | HEIGHT: 70 IN | OXYGEN SATURATION: 93 % | WEIGHT: 175.4 LBS | SYSTOLIC BLOOD PRESSURE: 130 MMHG

## 2020-05-07 DIAGNOSIS — Z12.5 SCREENING FOR PROSTATE CANCER: ICD-10-CM

## 2020-05-07 DIAGNOSIS — Z13.220 SCREENING CHOLESTEROL LEVEL: ICD-10-CM

## 2020-05-07 DIAGNOSIS — Z13.1 SCREENING FOR DIABETES MELLITUS: ICD-10-CM

## 2020-05-07 DIAGNOSIS — I10 ESSENTIAL HYPERTENSION: Primary | ICD-10-CM

## 2020-05-07 DIAGNOSIS — R04.0 EPISTAXIS: ICD-10-CM

## 2020-05-07 DIAGNOSIS — Z13.29 SCREENING FOR THYROID DISORDER: ICD-10-CM

## 2020-05-07 PROCEDURE — 1036F TOBACCO NON-USER: CPT | Performed by: FAMILY MEDICINE

## 2020-05-07 PROCEDURE — 3075F SYST BP GE 130 - 139MM HG: CPT | Performed by: FAMILY MEDICINE

## 2020-05-07 PROCEDURE — 3080F DIAST BP >= 90 MM HG: CPT | Performed by: FAMILY MEDICINE

## 2020-05-07 PROCEDURE — 99214 OFFICE O/P EST MOD 30 MIN: CPT | Performed by: FAMILY MEDICINE

## 2020-05-07 PROCEDURE — 3008F BODY MASS INDEX DOCD: CPT | Performed by: FAMILY MEDICINE

## 2021-03-08 ENCOUNTER — OFFICE VISIT (OUTPATIENT)
Dept: FAMILY MEDICINE CLINIC | Facility: CLINIC | Age: 56
End: 2021-03-08
Payer: COMMERCIAL

## 2021-03-08 VITALS
DIASTOLIC BLOOD PRESSURE: 80 MMHG | TEMPERATURE: 97.7 F | HEIGHT: 70 IN | RESPIRATION RATE: 16 BRPM | BODY MASS INDEX: 25.48 KG/M2 | WEIGHT: 178 LBS | OXYGEN SATURATION: 98 % | SYSTOLIC BLOOD PRESSURE: 120 MMHG | HEART RATE: 76 BPM

## 2021-03-08 DIAGNOSIS — H10.32 ACUTE BACTERIAL CONJUNCTIVITIS OF LEFT EYE: Primary | ICD-10-CM

## 2021-03-08 PROCEDURE — 1036F TOBACCO NON-USER: CPT | Performed by: FAMILY MEDICINE

## 2021-03-08 PROCEDURE — 99213 OFFICE O/P EST LOW 20 MIN: CPT | Performed by: FAMILY MEDICINE

## 2021-03-08 PROCEDURE — 3008F BODY MASS INDEX DOCD: CPT | Performed by: FAMILY MEDICINE

## 2021-03-08 RX ORDER — TOBRAMYCIN AND DEXAMETHASONE 3; 1 MG/ML; MG/ML
1 SUSPENSION/ DROPS OPHTHALMIC
Qty: 5 ML | Refills: 0 | Status: SHIPPED | OUTPATIENT
Start: 2021-03-08 | End: 2022-05-05

## 2021-03-09 NOTE — PROGRESS NOTES
[facility]      NAME: Issac Schilder  AGE: 60 y o  SEX: male  : 1965   MRN: [id number]    DATE: 3/8/2021  TIME: 7:14 PM    Assessment and Plan     Problem List Items Addressed This Visit     None      Visit Diagnoses     Acute bacterial conjunctivitis of left eye    -  Primary    Relevant Medications    tobramycin-dexamethasone (TOBRADEX) ophthalmic suspension         Rx given for tobramycin /dexamethasone ophthalmic drops  Call further problems      Return to office in:  Call further problems    Chief Complaint     Chief Complaint   Patient presents with    Eye Redness       History of Present Illness      Patient complains of few day history irritation and redness inner aspect of left eye  No foreign body sensation  The following portions of the patient's history were reviewed and updated as appropriate: allergies, current medications, past family history, past medical history, past social history, past surgical history and problem list     Review of Systems   Review of Systems   Eyes: Positive for discharge, redness and itching  Negative for pain  Respiratory: Negative  Cardiovascular: Negative  Gastrointestinal: Negative  Genitourinary: Negative  Active Problem List     Patient Active Problem List   Diagnosis    Essential hypertension    Epistaxis       Objective   /80 (BP Location: Left arm, Patient Position: Sitting, Cuff Size: Adult)   Pulse 76   Temp 97 7 °F (36 5 °C) (Tympanic)   Resp 16   Ht 5' 9 69" (1 77 m)   Wt 80 7 kg (178 lb)   SpO2 98%   BMI 25 77 kg/m²     Physical Exam  Eyes:      Comments:  Conjunctiva injected left eye, especially medial aspect  No evidence of foreign body    Minimal tearing         Pertinent Laboratory/Diagnostic Studies:   none    Current Medications     Current Outpatient Medications:     betamethasone, augmented, (DIPROLENE-AF) 0 05 % cream, Apply topically 2 (two) times a day, Disp: , Rfl:    fluticasone (FLONASE) 50 mcg/act nasal spray, 1 spray into each nostril daily, Disp: 1 Bottle, Rfl: 0    oxymetazoline (AFRIN) 0 05 % nasal spray, 2 sprays by Each Nare route every 12 (twelve) hours as needed (nosebleeds), Disp: 30 mL, Rfl: 0    sodium chloride (OCEAN) 0 65 % nasal spray, 1 spray into each nostril as needed (nosebleeds), Disp: 104 mL, Rfl: 0    tobramycin-dexamethasone (TOBRADEX) ophthalmic suspension, Administer 1 drop into the left eye every 4 (four) hours while awake, Disp: 5 mL, Rfl: 0    Health Maintenance     Health Maintenance   Topic Date Due    Hepatitis C Screening  1965    HIV Screening  10/31/1978    BMI: Followup Plan  10/31/1981    Annual Physical  10/31/1981    DTaP,Tdap,and Td Vaccines (1 - Tdap) 10/31/1984    Influenza Vaccine (1) 09/01/2020    Depression Screening PHQ  05/07/2021    Colonoscopy Surveillance  02/23/2022    BMI: Adult  03/08/2022    Colorectal Cancer Screening  02/23/2027    Pneumococcal Vaccine: Pediatrics (0 to 5 Years) and At-Risk Patients (6 to 59 Years)  Aged Out    HIB Vaccine  Aged Out    Hepatitis B Vaccine  Aged Out    IPV Vaccine  Aged Out    Hepatitis A Vaccine  Aged Out    Meningococcal ACWY Vaccine  Aged Out    HPV Vaccine  Aged [employer] History   Administered Date(s) Administered    INFLUENZA 11/08/2020       DO [facility]

## 2021-03-26 DIAGNOSIS — Z23 ENCOUNTER FOR IMMUNIZATION: ICD-10-CM

## 2022-05-05 ENCOUNTER — AMB VIDEO VISIT (OUTPATIENT)
Dept: OTHER | Facility: HOSPITAL | Age: 57
End: 2022-05-05

## 2022-05-05 VITALS — HEART RATE: 62 BPM

## 2022-05-05 DIAGNOSIS — J01.40 ACUTE PANSINUSITIS, RECURRENCE NOT SPECIFIED: Primary | ICD-10-CM

## 2022-05-05 RX ORDER — AMOXICILLIN AND CLAVULANATE POTASSIUM 875; 125 MG/1; MG/1
1 TABLET, FILM COATED ORAL EVERY 12 HOURS SCHEDULED
Qty: 20 TABLET | Refills: 0 | Status: SHIPPED | OUTPATIENT
Start: 2022-05-05 | End: 2022-05-15

## 2022-05-05 RX ORDER — FLUTICASONE PROPIONATE 50 MCG
1 SPRAY, SUSPENSION (ML) NASAL DAILY
Qty: 7 G | Refills: 0 | Status: SHIPPED | OUTPATIENT
Start: 2022-05-05 | End: 2022-05-12

## 2022-05-05 NOTE — PROGRESS NOTES
Video Visit - Bruna Cesar 60 y o  male MRN: [id number]    REQUIRED DOCUMENTATION:         1  This service was provided via [employer]  2  Provider located at [address] [place] [id number]  3  [facility] provider: CONNIE Chris  4  Identify all parties in room with patient during [facility] visit:  Patient   5  After connecting through [employer], patient was identified by name and date of birth  Patient was then informed that this was a Telemedicine visit and that the exam was being conducted confidentially over secure lines  My office door was closed  No one else was in the room  Patient acknowledged consent and understanding of privacy and security of the Telemedicine visit  I informed the patient that I have reviewed their record in Epic and presented the opportunity for them to ask any questions regarding the visit today  The patient agreed to participate  This is a 60year old male here today for video visit  He states symptoms started about 2 weeks ago  He had flu like symptoms  He states he used Ibuprofen and mucinex  He denies any fever, body aches or chills  He is having greenish to yellowish nasal discharge at this time  He denies significant sinus pressure  No fever, body aches or chills  He is vaccinated for covid  He states he was positivefor covid about 1 5 months ago  Review of Systems   Constitutional: Negative for activity change, chills, fatigue and fever  HENT: Positive for congestion and postnasal drip  Negative for sinus pressure, sinus pain and tinnitus  Respiratory: Negative for cough  Cardiovascular: Negative for chest pain  Musculoskeletal: Negative  Skin: Negative  Neurological: Negative  Psychiatric/Behavioral: Negative  Physical Exam  Constitutional:       General: He is not in acute distress  Appearance: Normal appearance  He is ill-appearing  He is not toxic-appearing     HENT:      Head: Normocephalic and atraumatic  Nose: Congestion present  Eyes:      Conjunctiva/sclera: Conjunctivae normal    Pulmonary:      Effort: Pulmonary effort is normal  No respiratory distress  Breath sounds: No wheezing  Comments: No cough or audible wheezing  Able to speak in full sentences   Skin:     Comments: No rash on head or neck  Neurological:      Mental Status: He is alert and oriented to person, place, and time  Psychiatric:         Mood and Affect: Mood normal          Behavior: Behavior normal          Thought Content: Thought content normal          Judgment: Judgment normal        Diagnoses and all orders for this visit:    Acute pansinusitis, recurrence not specified  -     amoxicillin-clavulanate (AUGMENTIN) 875-125 mg per tablet; Take 1 tablet by mouth every 12 (twelve) hours for 10 days  -     fluticasone (FLONASE) 50 mcg/act nasal spray; 1 spray into each nostril daily for 7 days      Patient Instructions   Will treat for sinus infection  Start antibiotic  Flonase as prescribed  Nasal saline flushes, pamella pot  Tylenol or motrin as needed  Follow up with PCP if no improvement  Go to ER with any worsening symptoms, chest pain or sob  Follow up with PCP if not improved, if symptoms are worse, go to the ER

## 2022-05-05 NOTE — PATIENT INSTRUCTIONS
Will treat for sinus infection  Start antibiotic  Flonase as prescribed  Nasal saline flushes, pamella pot  Tylenol or motrin as needed  Follow up with PCP if no improvement  Go to ER with any worsening symptoms, chest pain or sob

## 2022-05-05 NOTE — CARE ANYWHERE EVISITS
Visit Summary for Delmar Morataya Gender: Male - Date of Birth: [id number]  Date: [id number] - Duration: 6 minutes  Patient: Delmar Morataya  Provider: [id number] [employer]    Patient Contact Information  Address  [address] [facility]; [address]  [phone]    Visit Topics  Flu-Like Symptoms [Added By: Self - 2022-05-05]    Triage Questions   What is your current physical address in the event of a medical emergency? Answer []  Are you allergic to any medications? Answer []  Are you now or could you be pregnant? Answer []  Do you have any immune system compromise or chronic lung   disease? Answer []  Do you have any vulnerable family members in the home (infant, pregnant, cancer, elderly)? Answer []     Conversation Transcripts  [0A][0A] [Notification] You are connected with Russell Gonzalez, Urgent Care Specialist [0A][Notification] Delmar Morataya is located in [address] [place] [address]  [0A][Notification] Delmar Morataya has shared health history  Fred Gross  [0A]    Diagnosis  Acute pansinusitis    Procedures  Value: [id number] Code: CPT-4 UNLISTED E&M SERVICE    Medications Prescribed    No prescriptions ordered    Electronically signed by: Russell Mcguire(NPI [id number])

## 2023-07-17 ENCOUNTER — HOSPITAL ENCOUNTER (OUTPATIENT)
Dept: ULTRASOUND IMAGING | Facility: HOSPITAL | Age: 58
Discharge: HOME | End: 2023-07-17
Attending: FAMILY MEDICINE
Payer: COMMERCIAL

## 2023-07-17 ENCOUNTER — OFFICE VISIT (OUTPATIENT)
Dept: FAMILY MEDICINE CLINIC | Facility: CLINIC | Age: 58
End: 2023-07-17
Payer: COMMERCIAL

## 2023-07-17 ENCOUNTER — APPOINTMENT (OUTPATIENT)
Dept: LAB | Facility: CLINIC | Age: 58
End: 2023-07-17
Payer: COMMERCIAL

## 2023-07-17 VITALS
WEIGHT: 172 LBS | HEIGHT: 70 IN | SYSTOLIC BLOOD PRESSURE: 122 MMHG | TEMPERATURE: 97.4 F | HEART RATE: 76 BPM | OXYGEN SATURATION: 98 % | BODY MASS INDEX: 24.62 KG/M2 | DIASTOLIC BLOOD PRESSURE: 86 MMHG

## 2023-07-17 DIAGNOSIS — Z11.59 NEED FOR HEPATITIS C SCREENING TEST: ICD-10-CM

## 2023-07-17 DIAGNOSIS — R53.83 FATIGUE, UNSPECIFIED TYPE: ICD-10-CM

## 2023-07-17 DIAGNOSIS — E07.9 THYROID MASS: ICD-10-CM

## 2023-07-17 DIAGNOSIS — Z11.4 SCREENING FOR HIV (HUMAN IMMUNODEFICIENCY VIRUS): ICD-10-CM
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
## 2023-07-17 DIAGNOSIS — Z13.220 SCREENING FOR LIPID DISORDERS: ICD-10-CM

## 2023-07-17 DIAGNOSIS — M54.2 NECK PAIN: ICD-10-CM

## 2023-07-17 DIAGNOSIS — E07.9 THYROID MASS: Primary | ICD-10-CM

## 2023-07-17 DIAGNOSIS — Z12.5 SCREENING FOR PROSTATE CANCER: ICD-10-CM

## 2023-07-17 LAB
BASOPHILS # BLD AUTO: 0.03 THOUSANDS/ÂΜL (ref 0–0.1)
BASOPHILS NFR BLD AUTO: 1 % (ref 0–1)
EOSINOPHIL # BLD AUTO: 0.15 THOUSAND/ÂΜL (ref 0–0.61)
EOSINOPHIL NFR BLD AUTO: 3 % (ref 0–6)
ERYTHROCYTE [DISTWIDTH] IN BLOOD BY AUTOMATED COUNT: 12.4 % (ref 11.6–15.1)
HCT VFR BLD AUTO: 46.3 % (ref 36.5–49.3)
HGB BLD-MCNC: 15.9 G/DL (ref 12–17)
IMM GRANULOCYTES # BLD AUTO: 0.02 THOUSAND/UL (ref 0–0.2)
IMM GRANULOCYTES NFR BLD AUTO: 0 % (ref 0–2)
LYMPHOCYTES # BLD AUTO: 1.37 THOUSANDS/ÂΜL (ref 0.6–4.47)
LYMPHOCYTES NFR BLD AUTO: 29 % (ref 14–44)
MCH RBC QN AUTO: 31.1 PG (ref 26.8–34.3)
MCHC RBC AUTO-ENTMCNC: 34.3 G/DL (ref 31.4–37.4)
MCV RBC AUTO: 91 FL (ref 82–98)
MONOCYTES # BLD AUTO: 0.47 THOUSAND/ÂΜL (ref 0.17–1.22)
MONOCYTES NFR BLD AUTO: 10 % (ref 4–12)
NEUTROPHILS # BLD AUTO: 2.62 THOUSANDS/ÂΜL (ref 1.85–7.62)
NEUTS SEG NFR BLD AUTO: 57 % (ref 43–75)
NRBC BLD AUTO-RTO: 0 /100 WBCS
PLATELET # BLD AUTO: 247 THOUSANDS/UL (ref 149–390)
PMV BLD AUTO: 8.9 FL (ref 8.9–12.7)
PSA SERPL-MCNC: 0.62 NG/ML (ref 0–4)
RBC # BLD AUTO: 5.11 MILLION/UL (ref 3.88–5.62)
TSH SERPL DL<=0.05 MIU/L-ACNC: 1.46 UIU/ML (ref 0.45–4.5)
WBC # BLD AUTO: 4.66 THOUSAND/UL (ref 4.31–10.16)

## 2023-07-17 PROCEDURE — 76536 US EXAM OF HEAD AND NECK: CPT

## 2023-07-17 PROCEDURE — 86803 HEPATITIS C AB TEST: CPT

## 2023-07-17 PROCEDURE — 87389 HIV-1 AG W/HIV-1&-2 AB AG IA: CPT

## 2023-07-17 PROCEDURE — 86038 ANTINUCLEAR ANTIBODIES: CPT

## 2023-07-17 PROCEDURE — G0103 PSA SCREENING: HCPCS

## 2023-07-17 PROCEDURE — 36415 COLL VENOUS BLD VENIPUNCTURE: CPT

## 2023-07-17 PROCEDURE — 99215 OFFICE O/P EST HI 40 MIN: CPT | Performed by: FAMILY MEDICINE

## 2023-07-17 PROCEDURE — 86618 LYME DISEASE ANTIBODY: CPT

## 2023-07-17 PROCEDURE — 85025 COMPLETE CBC W/AUTO DIFF WBC: CPT

## 2023-07-17 PROCEDURE — 84443 ASSAY THYROID STIM HORMONE: CPT

## 2023-07-17 NOTE — ASSESSMENT & PLAN NOTE
Patient with chronic musculoskeletal neck pain after he strained his neck a few months ago. Patient was referred to PT for evaluation and treatment.

## 2023-07-17 NOTE — PROGRESS NOTES
Assessment/Plan:    1. Tick bite, unspecified site, initial encounter  Assessment & Plan:  Patient with tick bite a couple months ago. He has since experiences arthralgias as well as chronic fatigue. We will screen for lyme disease today and treat if indicated. Orders:  -     Lyme Total AB W Reflex to IGM/IGG; Future    2. Fatigue, unspecified type  Assessment & Plan:  Patient with tick bite a couple months ago. He has since experiences arthralgias as well as chronic fatigue. We will screen for lyme disease today and treat if indicated. We will also check for other lab screening tests and obtain thyroid ultrasound to evaluation for thyroid mass. Orders:  -     CBC and differential; Future; Expected date: 07/17/2023  -     Comprehensive metabolic panel; Future  -     TSH, 3rd generation with Free T4 reflex; Future; Expected date: 07/17/2023  -     THEO Screen w/ Reflex to Titer/Pattern; Future    3. Thyroid mass  Assessment & Plan:  Incidental right thyroid mass found on physical exam, 2-3 cm in size. We will obtain thyroid US for evaluation as soon as possible. Orders:  -     US thyroid; Future; Expected date: 07/17/2023    4. Neck pain  Assessment & Plan:  Patient with chronic musculoskeletal neck pain after he strained his neck a few months ago. Patient was referred to PT for evaluation and treatment. Orders:  -     Ambulatory Referral to Physical Therapy; Future  -     THEO Screen w/ Reflex to Titer/Pattern; Future    5. Screening for lipid disorders  -     Lipid Panel with Direct LDL reflex; Future; Expected date: 07/17/2023    6. Screening for prostate cancer  -     PSA, Total Screen; Future    7. Screening for HIV (human immunodeficiency virus)  -     HIV 1/2 AG/AB w Reflex [facility] for 2 yr old and above; Future    8. Need for hepatitis C screening test  -     Hepatitis C Antibody; Future      Subjective:      Patient ID: Kingston Lee is a 59 y.o. male.     HPI    Patient presenting with complaint of fatigue ever since he was bitten by a tick May 1st.   Patient has been feeling more fatigue than usual.  Patient sleeps about 7 hours but it is interrupted by his cat. He is generally active and hikes and is outdoors regularly. He does have some muscle aches in his neck since he strained his neck in the winter. He has had improvement in his neck pain but notices that he has trouble turning his neck completely to the right due to muscular pain. He also is having more knee pain and has just noticed general feeling of tiredness throughout the day. He denies any rashes or skin changes. Denies chest pain, dizziness or palpitations. The following portions of the patient's history were reviewed and updated as appropriate: allergies, current medications, past family history, past medical history, past social history, past surgical history, and problem list.    No current outpatient medications on file. Review of Systems   Constitutional: Positive for fatigue. Negative for chills and fever. HENT: Negative for ear pain, sore throat and trouble swallowing. Eyes: Negative for pain and visual disturbance. Respiratory: Negative for cough and shortness of breath. Cardiovascular: Negative for chest pain and palpitations. Gastrointestinal: Negative for abdominal pain and vomiting. Genitourinary: Negative for dysuria and hematuria. Musculoskeletal: Negative for arthralgias and back pain. Skin: Negative for color change and rash. Neurological: Negative for seizures and syncope. All other systems reviewed and are negative. Objective:      /86 (BP Location: Left arm, Patient Position: Sitting, Cuff Size: Adult)   Pulse 76   Temp (!) 97.4 °F (36.3 °C) (Temporal)   Ht 5' 10" (1.778 m)   Wt 78 kg (172 lb)   SpO2 98%   BMI 24.68 kg/m²          Physical Exam  Vitals and nursing note reviewed. Constitutional:       General: He is not in acute distress. Appearance: Normal appearance. He is not toxic-appearing. HENT:      Head: Normocephalic and atraumatic. Right Ear: External ear normal. There is no impacted cerumen. Left Ear: External ear normal. There is no impacted cerumen. Nose: Nose normal.      Mouth/Throat:      Mouth: Mucous membranes are moist.      Pharynx: Oropharynx is clear. No oropharyngeal exudate. Eyes:      Extraocular Movements: Extraocular movements intact. Conjunctiva/sclera: Conjunctivae normal.   Neck:      Thyroid: Thyroid mass (right) present. No thyromegaly. Vascular: No carotid bruit. Cardiovascular:      Rate and Rhythm: Normal rate and regular rhythm. Heart sounds: Normal heart sounds. No murmur heard. Pulmonary:      Effort: Pulmonary effort is normal. No accessory muscle usage or respiratory distress. Breath sounds: Normal breath sounds and air entry. Abdominal:      General: Bowel sounds are normal. There is no distension. Palpations: Abdomen is soft. There is no mass. Tenderness: There is no abdominal tenderness. Musculoskeletal:         General: Normal range of motion. Cervical back: Full passive range of motion without pain and normal range of motion. Lymphadenopathy:      Cervical: No cervical adenopathy. Skin:     General: Skin is warm and dry. Neurological:      General: No focal deficit present. Mental Status: He is alert and oriented to person, place, and time. Psychiatric:         Mood and Affect: Mood normal.         Behavior: Behavior normal.         Thought Content:  Thought content normal.

## 2023-07-17 NOTE — ASSESSMENT & PLAN NOTE
Patient with tick bite a couple months ago. He has since experiences arthralgias as well as chronic fatigue. We will screen for lyme disease today and treat if indicated. We will also check for other lab screening tests and obtain thyroid ultrasound to evaluation for thyroid mass.

## 2023-07-17 NOTE — ASSESSMENT & PLAN NOTE
Incidental right thyroid mass found on physical exam, 2-3 cm in size. We will obtain thyroid US for evaluation as soon as possible.

## 2023-07-17 NOTE — ASSESSMENT & PLAN NOTE
Patient with tick bite a couple months ago. He has since experiences arthralgias as well as chronic fatigue. We will screen for lyme disease today and treat if indicated.

## 2023-07-18 LAB
ANA SER QL IA: NEGATIVE
B BURGDOR IGG+IGM SER QL IA: NEGATIVE
HCV AB SER QL: NORMAL
HIV 1+2 AB+HIV1 P24 AG SERPL QL IA: NORMAL
HIV 2 AB SERPL QL IA: NORMAL
HIV1 AB SERPL QL IA: NORMAL
HIV1 P24 AG SERPL QL IA: NORMAL

## 2023-07-18 NOTE — NURSING NOTE
Call placed to patient to discuss upcoming appointment at [facility] radiology department and complete consultation with patient. Patient is having a thyroid biopsy utilizing US  guidance. Reviewed patient's allergies, no current anticoagulant medication present , also discussed the pre and post procedure expectations. Reminded patient of location and time expected for procedure, Patient expressed understanding by verbalizing and repeating instructions.

## 2023-07-25 ENCOUNTER — HOSPITAL ENCOUNTER (OUTPATIENT)
Dept: RADIOLOGY | Facility: HOSPITAL | Age: 58
Discharge: HOME | End: 2023-07-25
Attending: FAMILY MEDICINE | Admitting: RADIOLOGY
Payer: COMMERCIAL

## 2023-07-25 DIAGNOSIS — E07.9 THYROID MASS: ICD-10-CM

## 2023-07-25 PROCEDURE — 88173 CYTOPATH EVAL FNA REPORT: CPT | Performed by: STUDENT IN AN ORGANIZED HEALTH CARE EDUCATION/TRAINING PROGRAM

## 2023-07-25 PROCEDURE — 10005 FNA BX W/US GDN 1ST LES: CPT

## 2023-07-25 RX ORDER — LIDOCAINE HYDROCHLORIDE 10 MG/ML
5 INJECTION, SOLUTION EPIDURAL; INFILTRATION; INTRACAUDAL; PERINEURAL ONCE
Status: COMPLETED | OUTPATIENT
Start: 2023-07-25 | End: 2023-07-25

## 2023-07-25 RX ADMIN — LIDOCAINE HYDROCHLORIDE 5 ML: 10 INJECTION, SOLUTION EPIDURAL; INFILTRATION; INTRACAUDAL; PERINEURAL at 11:17

## 2023-07-28 PROCEDURE — 88173 CYTOPATH EVAL FNA REPORT: CPT | Performed by: STUDENT IN AN ORGANIZED HEALTH CARE EDUCATION/TRAINING PROGRAM

## 2024-10-23 ENCOUNTER — OFFICE VISIT (OUTPATIENT)
Dept: FAMILY MEDICINE CLINIC | Facility: CLINIC | Age: 59
End: 2024-10-23
Payer: COMMERCIAL

## 2024-10-23 VITALS
OXYGEN SATURATION: 97 % | BODY MASS INDEX: 25.2 KG/M2 | HEIGHT: 70 IN | WEIGHT: 176 LBS | HEART RATE: 65 BPM | SYSTOLIC BLOOD PRESSURE: 121 MMHG | TEMPERATURE: 97.7 F | DIASTOLIC BLOOD PRESSURE: 80 MMHG

## 2024-10-23 DIAGNOSIS — L23.9 ALLERGIC CONTACT DERMATITIS, UNSPECIFIED TRIGGER: ICD-10-CM

## 2024-10-23 DIAGNOSIS — J40 BRONCHITIS: Primary | ICD-10-CM

## 2024-10-23 PROCEDURE — 99213 OFFICE O/P EST LOW 20 MIN: CPT | Performed by: FAMILY MEDICINE

## 2024-10-23 RX ORDER — AZITHROMYCIN 250 MG/1
TABLET, FILM COATED ORAL
Qty: 6 TABLET | Refills: 0 | Status: SHIPPED | OUTPATIENT
Start: 2024-10-23 | End: 2024-10-28

## 2024-10-23 RX ORDER — BENZONATATE 200 MG/1
200 CAPSULE ORAL 3 TIMES DAILY PRN
Qty: 30 CAPSULE | Refills: 1 | Status: SHIPPED | OUTPATIENT
Start: 2024-10-23

## 2024-10-23 RX ORDER — TRIAMCINOLONE ACETONIDE 5 MG/G
CREAM TOPICAL 2 TIMES DAILY
Qty: 15 G | Refills: 1 | Status: SHIPPED | OUTPATIENT
Start: 2024-10-23

## 2024-10-23 RX ORDER — METHYLPREDNISOLONE 4 MG/1
TABLET ORAL
Qty: 21 TABLET | Refills: 0 | Status: SHIPPED | OUTPATIENT
Start: 2024-10-23

## 2024-10-23 NOTE — PROGRESS NOTES
Ambulatory Visit  Name: Rodriguez Charlton      : 1965      MRN: [id number]  Encounter Provider: Fredy Clemens DO  Encounter Date: 10/23/2024   Encounter department: [facility]    Assessment & Plan  Bronchitis    Orders:    azithromycin (ZITHROMAX) 250 mg tablet; 2 tabs on day 1, then 1 tab daily x 4 days    benzonatate (TESSALON) 200 MG capsule; Take 1 capsule (200 mg total) by mouth 3 (three) times a day as needed for cough    Allergic contact dermatitis, unspecified trigger    Orders:    methylPREDNISolone 4 MG tablet therapy pack; Use as directed on package    triamcinolone (KENALOG) 0.5 % cream; Apply topically 2 (two) times a day prn       Call further problems  History of Present Illness     Patient presents with 2-week history of URI symptoms cough congestion.  Denies any fevers.  He is a non-smoker.  He is a teacher    Cough  Associated symptoms include a rash.   Rash  Associated symptoms include coughing.     Review of Systems   Respiratory:  Positive for cough.    Skin:  Positive for rash.     History reviewed. No pertinent past medical history.  Past Surgical History:   Procedure Laterality Date    KNEE SURGERY Bilateral     MS COLONOSCOPY FLX DX W/COLLJ SPEC WHEN PFRMD N/A 2017    Procedure: COLONOSCOPY;  Surgeon: Anupam Dean MD;  Location: [facility] GI LAB;  Service: Gastroenterology    US GUIDED THYROID BIOPSY  2023     History reviewed. No pertinent family history.  Social History     Tobacco Use    Smoking status: Never    Smokeless tobacco: Never   Vaping Use    Vaping status: Never Used   Substance and Sexual Activity    Alcohol use: Yes     Alcohol/week: 8.0 standard drinks of alcohol     Types: 8 Cans of beer per week    Drug use: No    Sexual activity: Yes     Partners: Female     No current outpatient medications on file prior to visit.     No Known Allergies  Immunization History   Administered Date(s) Administered    COVID-19 J&J (Minoo) vaccine  "0.5 mL 03/15/2021    COVID-19 PFIZER VACCINE 0.3 ML IM 12/20/2021    INFLUENZA 11/08/2020     Objective     /80 (BP Location: Left arm, Patient Position: Sitting, Cuff Size: Adult)   Pulse 65   Temp 97.7 °F (36.5 °C) (Temporal)   Ht 5' 10\" (1.778 m)   Wt 79.8 kg (176 lb)   SpO2 97%   BMI 25.25 kg/m²     Physical Exam  Constitutional:       Appearance: He is well-developed.   Pulmonary:      Effort: Pulmonary effort is normal.      Breath sounds: Normal breath sounds.   Musculoskeletal:      Cervical back: Normal range of motion and neck supple.         "

## 2025-05-30 ENCOUNTER — RA CDI HCC (OUTPATIENT)
Dept: OTHER | Facility: HOSPITAL | Age: 60
End: 2025-05-30

## 2025-06-03 ENCOUNTER — OFFICE VISIT (OUTPATIENT)
Dept: FAMILY MEDICINE CLINIC | Facility: CLINIC | Age: 60
End: 2025-06-03
Payer: COMMERCIAL

## 2025-06-03 VITALS
TEMPERATURE: 97.5 F | HEART RATE: 72 BPM | BODY MASS INDEX: 25.37 KG/M2 | OXYGEN SATURATION: 97 % | WEIGHT: 177.2 LBS | HEIGHT: 70 IN | DIASTOLIC BLOOD PRESSURE: 82 MMHG | SYSTOLIC BLOOD PRESSURE: 118 MMHG

## 2025-06-03 DIAGNOSIS — Z12.11 SCREENING FOR COLON CANCER: ICD-10-CM

## 2025-06-03 DIAGNOSIS — M25.421 EFFUSION OF RIGHT OLECRANON BURSA: Primary | ICD-10-CM

## 2025-06-03 PROCEDURE — 20605 DRAIN/INJ JOINT/BURSA W/O US: CPT | Performed by: FAMILY MEDICINE

## 2025-06-03 PROCEDURE — 99214 OFFICE O/P EST MOD 30 MIN: CPT | Performed by: FAMILY MEDICINE

## 2025-06-03 NOTE — PROGRESS NOTES
"Name: Rodriguez Charlton      : 1965      MRN: [id number]  Encounter Provider: Ana Deng DO  Encounter Date: 6/3/2025   Encounter department: [facility]  :  Assessment & Plan  Effusion of right olecranon bursa  Patient tolerated joint effusion aspiration very well.  Approximately 10 to 15 cc of fluid was drained appearing serosanguineous.  At this time, area was wrapped with an Ace wrap.  He may use his elbow sleeve at home.  Follow-up if any symptoms persist.              History of Present Illness   HPI  Patient is a 59-year-old male presents today with CC of right elbow swelling.  He said this problem for the past week.  Symptoms are gradual.  He denies any trauma to this area.  He denies any pain over this area.  He has not take anything for symptom relief.  Review of Systems   Constitutional:  Negative for activity change, chills, fatigue and fever.   HENT:  Negative for congestion, ear pain, sinus pressure and sore throat.    Eyes:  Negative for redness, itching and visual disturbance.   Respiratory:  Negative for cough and shortness of breath.    Cardiovascular:  Negative for chest pain and palpitations.   Gastrointestinal:  Negative for abdominal pain, diarrhea and nausea.   Endocrine: Negative for cold intolerance and heat intolerance.   Genitourinary:  Negative for dysuria, flank pain and frequency.   Musculoskeletal:  Negative for arthralgias, back pain, gait problem and myalgias.   Skin:  Negative for color change.   Allergic/Immunologic: Negative for environmental allergies.   Neurological:  Negative for dizziness, numbness and headaches.   Psychiatric/Behavioral:  Negative for behavioral problems and sleep disturbance.        Objective   /82 (BP Location: Left arm, Patient Position: Sitting, Cuff Size: Large)   Pulse 72   Temp 97.5 °F (36.4 °C) (Temporal)   Ht 5' 10\" (1.778 m)   Wt 80.4 kg (177 lb 3.2 oz)   SpO2 97%   BMI 25.43 kg/m²      Physical " Exam  Vitals reviewed.   Constitutional:       Appearance: Normal appearance. He is well-developed.   HENT:      Head: Normocephalic and atraumatic.      Right Ear: Hearing normal.      Left Ear: Hearing normal.      Nose: Nose normal. No septal deviation.     Eyes:      General: Lids are normal.      Pupils: Pupils are equal, round, and reactive to light.     Neck:      Thyroid: No thyroid mass or thyromegaly.      Trachea: Trachea normal.     Cardiovascular:      Rate and Rhythm: Normal rate.   Pulmonary:      Effort: Pulmonary effort is normal. No respiratory distress.      Breath sounds: No decreased breath sounds.   Abdominal:      Tenderness: There is no guarding.     Musculoskeletal:         General: Normal range of motion.        Arms:       Cervical back: Normal range of motion.     Skin:     General: Skin is warm and dry.     Neurological:      Mental Status: He is alert and oriented to person, place, and time.      Cranial Nerves: No cranial nerve deficit.      Sensory: No sensory deficit.     Psychiatric:         Speech: Speech normal.         Behavior: Behavior normal.         Thought Content: Thought content normal.         Judgment: Judgment normal.         Medium joint arthrocentesis: R olecranon bursa    Performed by: Ana Deng DO  Authorized by: Ana Deng DO    Universal Protocol:  Risks and benefits: risks, benefits and alternatives were discussed  Consent given by: patient  Timeout called at: 6/3/2025 1:34 PM.  Patient identity confirmed: verbally with patient  Supporting Documentation  Indications: pain and joint swelling   Procedure Details  Location: elbow - R olecranon bursa  Approach: anterolateral    Aspirate: serous  Patient tolerance: patient tolerated the procedure well with no immediate complications  Dressing:  Sterile dressing applied

## (undated) DEVICE — SINGLE-USE POLYPECTOMY SNARE: Brand: ROTATABLE SNARE